# Patient Record
Sex: FEMALE | Race: BLACK OR AFRICAN AMERICAN | NOT HISPANIC OR LATINO | URBAN - METROPOLITAN AREA
[De-identification: names, ages, dates, MRNs, and addresses within clinical notes are randomized per-mention and may not be internally consistent; named-entity substitution may affect disease eponyms.]

---

## 2022-06-01 ENCOUNTER — TELEPHONE (OUTPATIENT)
Dept: OBSTETRICS AND GYNECOLOGY | Facility: CLINIC | Age: 22
End: 2022-06-01
Payer: MEDICAID

## 2022-06-01 NOTE — TELEPHONE ENCOUNTER
Called pt to inform appt was moved to 9 am, 6/8 no answer, No VM       Albertina MAGALLANES LPN  OB/GYN

## 2022-06-01 NOTE — TELEPHONE ENCOUNTER
----- Message from Obey Garzon NP sent at 6/1/2022  9:18 AM CDT -----  Please change pt's appt to pregnancy confirmation and move to a 30 minute time slot.  Thanks.    Obey